# Patient Record
Sex: MALE | Race: WHITE | Employment: OTHER | URBAN - METROPOLITAN AREA
[De-identification: names, ages, dates, MRNs, and addresses within clinical notes are randomized per-mention and may not be internally consistent; named-entity substitution may affect disease eponyms.]

---

## 2018-07-13 PROBLEM — R33.9 URINARY RETENTION: Status: ACTIVE | Noted: 2018-07-13

## 2018-09-18 RX ORDER — THERA TABS 400 MCG
1 TAB ORAL DAILY
COMMUNITY

## 2018-09-18 NOTE — PERIOP NOTES
PAT - SURGICAL PRE-ADMISSION INSTRUCTIONS    NAME:  Nay Sepulveda                                                          TODAY'S DATE:  9/18/2018    SURGERY DATE:  9/20/2018                                  SURGERY ARRIVAL TIME:   0930    1. Do NOT eat or drink anything, including candy or gum, after MIDNIGHT on 09/19/18 , unless you have specific instructions from your Surgeon or Anesthesia Provider to do so. 2. No smoking on the day of surgery. 3. No alcohol 24 hours prior to the day of surgery. 4. No recreational drugs for one week prior to the day of surgery. 5. Leave all valuables, including money/purse, at home. 6. Remove all jewelry, nail polish, makeup (including mascara); no lotions, powders, deodorant, or perfume/cologne/after shave. 7. Glasses/Contact lenses and Dentures may be worn to the hospital.  They will be removed prior to surgery. 8. Call your doctor if symptoms of a cold or illness develop within 24 ours prior to surgery. 9. AN ADULT MUST DRIVE YOU HOME AFTER OUTPATIENT SURGERY. 10. If you are having an OUTPATIENT procedure, please make arrangements for a responsible adult to be with you for 24 hours after your surgery. 11. If you are admitted to the hospital, you will be assigned to a bed after surgery is complete. Normally a family member will not be able to see you until you are in your assigned bed. 15. Family is encouraged to accompany you to the hospital.  We ask visitors in the treatment area to be limited to ONE person at a time to ensure patient privacy. EXCEPTIONS WILL BE MADE AS NEEDED. 15. Children under 12 are discouraged from entering the treatment area and need to be supervised by an adult when in the waiting room. Special Instructions:    NONE. Patient Prep:    shower with anti-bacterial soap    These surgical instructions were reviewed with Nitesh Pavon during the PAT phone call. Directions:   On the morning of surgery, please go to the [facility]. Enter the building from the [facility] entrance, 1st floor (next to the Emergency Room entrance). Take the elevator to the 2nd floor. Sign in at the Registration Desk.     If you have any questions and/or concerns, please do not hesitate to call:  (Prior to the day of surgery)  [facility] [place] unit:  [phone]  (Day of surgery)  [facility] unit:  [phone]

## 2018-09-19 ENCOUNTER — ANESTHESIA EVENT (OUTPATIENT)
Dept: SURGERY | Age: 68
End: 2018-09-19
Payer: MEDICARE

## 2018-09-20 ENCOUNTER — ANESTHESIA (OUTPATIENT)
Dept: SURGERY | Age: 68
End: 2018-09-20
Payer: MEDICARE

## 2018-09-20 ENCOUNTER — HOSPITAL ENCOUNTER (OUTPATIENT)
Age: 68
Setting detail: OBSERVATION
Discharge: HOME | End: 2018-09-21
Attending: UROLOGY | Admitting: UROLOGY
Payer: MEDICARE

## 2018-09-20 PROBLEM — N40.1 BPH WITH OBSTRUCTION/LOWER URINARY TRACT SYMPTOMS: Status: ACTIVE | Noted: 2018-09-20

## 2018-09-20 PROBLEM — N13.8 BPH WITH OBSTRUCTION/LOWER URINARY TRACT SYMPTOMS: Status: ACTIVE | Noted: 2018-09-20

## 2018-09-20 PROBLEM — N40.1 BPH WITH URINARY OBSTRUCTION: Status: ACTIVE | Noted: 2018-09-20

## 2018-09-20 PROBLEM — N13.8 BPH WITH URINARY OBSTRUCTION: Status: ACTIVE | Noted: 2018-09-20

## 2018-09-20 PROBLEM — R31.0 GROSS HEMATURIA: Status: ACTIVE | Noted: 2018-09-20

## 2018-09-20 PROCEDURE — 76210000026 HC REC RM PH II 1 TO 1.5 HR: Performed by: UROLOGY

## 2018-09-20 PROCEDURE — 76010000171 HC OR TIME 2 TO 2.5 HR INTENSV-TIER 1: Performed by: UROLOGY

## 2018-09-20 PROCEDURE — 74011250637 HC RX REV CODE- 250/637: Performed by: NURSE ANESTHETIST, CERTIFIED REGISTERED

## 2018-09-20 PROCEDURE — 74011250636 HC RX REV CODE- 250/636: Performed by: UROLOGY

## 2018-09-20 PROCEDURE — 74011250636 HC RX REV CODE- 250/636

## 2018-09-20 PROCEDURE — 77030018836 HC SOL IRR NACL ICUM -A

## 2018-09-20 PROCEDURE — 77030032490 HC SLV COMPR SCD KNE COVD -B: Performed by: UROLOGY

## 2018-09-20 PROCEDURE — 76210000017 HC OR PH I REC 1.5 TO 2 HR: Performed by: UROLOGY

## 2018-09-20 PROCEDURE — 77030010545: Performed by: UROLOGY

## 2018-09-20 PROCEDURE — 74011250636 HC RX REV CODE- 250/636: Performed by: NURSE ANESTHETIST, CERTIFIED REGISTERED

## 2018-09-20 PROCEDURE — 77030008683 HC TU ET CUF COVD -A: Performed by: ANESTHESIOLOGY

## 2018-09-20 PROCEDURE — 99218 HC RM OBSERVATION: CPT

## 2018-09-20 PROCEDURE — 77030013079 HC BLNKT BAIR HGGR 3M -A: Performed by: ANESTHESIOLOGY

## 2018-09-20 PROCEDURE — 77030027138 HC INCENT SPIROMETER -A

## 2018-09-20 PROCEDURE — 77030034696 HC CATH URETH FOL 2W BARD -A: Performed by: UROLOGY

## 2018-09-20 PROCEDURE — 77030018836 HC SOL IRR NACL ICUM -A: Performed by: UROLOGY

## 2018-09-20 PROCEDURE — 76060000035 HC ANESTHESIA 2 TO 2.5 HR: Performed by: UROLOGY

## 2018-09-20 PROCEDURE — 74011000258 HC RX REV CODE- 258: Performed by: UROLOGY

## 2018-09-20 PROCEDURE — 74011250637 HC RX REV CODE- 250/637: Performed by: UROLOGY

## 2018-09-20 PROCEDURE — 77030010545

## 2018-09-20 PROCEDURE — 74011000250 HC RX REV CODE- 250

## 2018-09-20 RX ORDER — SUCCINYLCHOLINE CHLORIDE 20 MG/ML
INJECTION INTRAMUSCULAR; INTRAVENOUS AS NEEDED
Status: DISCONTINUED | OUTPATIENT
Start: 2018-09-20 | End: 2018-09-20 | Stop reason: HOSPADM

## 2018-09-20 RX ORDER — SODIUM CHLORIDE 0.9 % (FLUSH) 0.9 %
5-10 SYRINGE (ML) INJECTION EVERY 8 HOURS
Status: DISCONTINUED | OUTPATIENT
Start: 2018-09-20 | End: 2018-09-21 | Stop reason: HOSPADM

## 2018-09-20 RX ORDER — DOCUSATE SODIUM 100 MG/1
100 CAPSULE, LIQUID FILLED ORAL 2 TIMES DAILY
Qty: 60 CAP | Refills: 0 | Status: SHIPPED | OUTPATIENT
Start: 2018-09-20 | End: 2018-10-20

## 2018-09-20 RX ORDER — OXYBUTYNIN CHLORIDE 5 MG/1
5 TABLET ORAL
Status: COMPLETED | OUTPATIENT
Start: 2018-09-20 | End: 2018-09-20

## 2018-09-20 RX ORDER — MIDAZOLAM HYDROCHLORIDE 1 MG/ML
INJECTION, SOLUTION INTRAMUSCULAR; INTRAVENOUS AS NEEDED
Status: DISCONTINUED | OUTPATIENT
Start: 2018-09-20 | End: 2018-09-20 | Stop reason: HOSPADM

## 2018-09-20 RX ORDER — MORPHINE SULFATE 2 MG/ML
2 INJECTION, SOLUTION INTRAMUSCULAR; INTRAVENOUS
Status: DISCONTINUED | OUTPATIENT
Start: 2018-09-20 | End: 2018-09-21 | Stop reason: HOSPADM

## 2018-09-20 RX ORDER — FAMOTIDINE 20 MG/1
20 TABLET, FILM COATED ORAL ONCE
Status: COMPLETED | OUTPATIENT
Start: 2018-09-20 | End: 2018-09-20

## 2018-09-20 RX ORDER — FUROSEMIDE 10 MG/ML
INJECTION INTRAMUSCULAR; INTRAVENOUS AS NEEDED
Status: DISCONTINUED | OUTPATIENT
Start: 2018-09-20 | End: 2018-09-20 | Stop reason: HOSPADM

## 2018-09-20 RX ORDER — SODIUM CHLORIDE 0.9 % (FLUSH) 0.9 %
5-10 SYRINGE (ML) INJECTION AS NEEDED
Status: DISCONTINUED | OUTPATIENT
Start: 2018-09-20 | End: 2018-09-21 | Stop reason: HOSPADM

## 2018-09-20 RX ORDER — FENTANYL CITRATE 50 UG/ML
50 INJECTION, SOLUTION INTRAMUSCULAR; INTRAVENOUS
Status: DISCONTINUED | OUTPATIENT
Start: 2018-09-20 | End: 2018-09-20

## 2018-09-20 RX ORDER — OXYCODONE AND ACETAMINOPHEN 5; 325 MG/1; MG/1
1 TABLET ORAL
Status: COMPLETED | OUTPATIENT
Start: 2018-09-20 | End: 2018-09-20

## 2018-09-20 RX ORDER — DUTASTERIDE 0.5 MG/1
0.5 CAPSULE, LIQUID FILLED ORAL
Status: DISCONTINUED | OUTPATIENT
Start: 2018-09-20 | End: 2018-09-20 | Stop reason: DRUGHIGH

## 2018-09-20 RX ORDER — SODIUM CHLORIDE 9 MG/ML
75 INJECTION, SOLUTION INTRAVENOUS CONTINUOUS
Status: DISCONTINUED | OUTPATIENT
Start: 2018-09-20 | End: 2018-09-21 | Stop reason: HOSPADM

## 2018-09-20 RX ORDER — OXYCODONE AND ACETAMINOPHEN 5; 325 MG/1; MG/1
1 TABLET ORAL
Status: DISCONTINUED | OUTPATIENT
Start: 2018-09-20 | End: 2018-09-21 | Stop reason: HOSPADM

## 2018-09-20 RX ORDER — FENTANYL CITRATE 50 UG/ML
INJECTION, SOLUTION INTRAMUSCULAR; INTRAVENOUS AS NEEDED
Status: DISCONTINUED | OUTPATIENT
Start: 2018-09-20 | End: 2018-09-20 | Stop reason: HOSPADM

## 2018-09-20 RX ORDER — OXYBUTYNIN CHLORIDE 5 MG/1
5 TABLET ORAL
Status: DISCONTINUED | OUTPATIENT
Start: 2018-09-20 | End: 2018-09-21 | Stop reason: HOSPADM

## 2018-09-20 RX ORDER — DUTASTERIDE 0.5 MG/1
0.5 CAPSULE, LIQUID FILLED ORAL
Status: DISCONTINUED | OUTPATIENT
Start: 2018-09-20 | End: 2018-09-21 | Stop reason: HOSPADM

## 2018-09-20 RX ORDER — SODIUM CHLORIDE 0.9 % (FLUSH) 0.9 %
5-10 SYRINGE (ML) INJECTION EVERY 8 HOURS
Status: DISCONTINUED | OUTPATIENT
Start: 2018-09-20 | End: 2018-09-20 | Stop reason: HOSPADM

## 2018-09-20 RX ORDER — DOCUSATE SODIUM 100 MG/1
100 CAPSULE, LIQUID FILLED ORAL 2 TIMES DAILY
Status: DISCONTINUED | OUTPATIENT
Start: 2018-09-20 | End: 2018-09-21 | Stop reason: HOSPADM

## 2018-09-20 RX ORDER — LIDOCAINE HYDROCHLORIDE 10 MG/ML
0.1 INJECTION, SOLUTION EPIDURAL; INFILTRATION; INTRACAUDAL; PERINEURAL AS NEEDED
Status: DISCONTINUED | OUTPATIENT
Start: 2018-09-20 | End: 2018-09-20 | Stop reason: HOSPADM

## 2018-09-20 RX ORDER — PROPOFOL 10 MG/ML
INJECTION, EMULSION INTRAVENOUS AS NEEDED
Status: DISCONTINUED | OUTPATIENT
Start: 2018-09-20 | End: 2018-09-20 | Stop reason: HOSPADM

## 2018-09-20 RX ORDER — MAGNESIUM SULFATE 100 %
4 CRYSTALS MISCELLANEOUS AS NEEDED
Status: DISCONTINUED | OUTPATIENT
Start: 2018-09-20 | End: 2018-09-20

## 2018-09-20 RX ORDER — FENTANYL CITRATE 50 UG/ML
25 INJECTION, SOLUTION INTRAMUSCULAR; INTRAVENOUS AS NEEDED
Status: DISCONTINUED | OUTPATIENT
Start: 2018-09-20 | End: 2018-09-20

## 2018-09-20 RX ORDER — NALOXONE HYDROCHLORIDE 0.4 MG/ML
0.4 INJECTION, SOLUTION INTRAMUSCULAR; INTRAVENOUS; SUBCUTANEOUS AS NEEDED
Status: DISCONTINUED | OUTPATIENT
Start: 2018-09-20 | End: 2018-09-21 | Stop reason: HOSPADM

## 2018-09-20 RX ORDER — ACETAMINOPHEN 325 MG/1
650 TABLET ORAL
Status: DISCONTINUED | OUTPATIENT
Start: 2018-09-20 | End: 2018-09-21 | Stop reason: HOSPADM

## 2018-09-20 RX ORDER — SODIUM CHLORIDE 0.9 % (FLUSH) 0.9 %
5-10 SYRINGE (ML) INJECTION AS NEEDED
Status: DISCONTINUED | OUTPATIENT
Start: 2018-09-20 | End: 2018-09-20 | Stop reason: HOSPADM

## 2018-09-20 RX ORDER — OXYBUTYNIN CHLORIDE 5 MG/1
5 TABLET ORAL 3 TIMES DAILY
Qty: 15 TAB | Refills: 3 | Status: SHIPPED | OUTPATIENT
Start: 2018-09-20 | End: 2019-04-05

## 2018-09-20 RX ORDER — TAMSULOSIN HYDROCHLORIDE 0.4 MG/1
0.8 CAPSULE ORAL
Status: DISCONTINUED | OUTPATIENT
Start: 2018-09-20 | End: 2018-09-21 | Stop reason: HOSPADM

## 2018-09-20 RX ORDER — SODIUM CHLORIDE, SODIUM LACTATE, POTASSIUM CHLORIDE, CALCIUM CHLORIDE 600; 310; 30; 20 MG/100ML; MG/100ML; MG/100ML; MG/100ML
50 INJECTION, SOLUTION INTRAVENOUS CONTINUOUS
Status: DISCONTINUED | OUTPATIENT
Start: 2018-09-20 | End: 2018-09-20 | Stop reason: HOSPADM

## 2018-09-20 RX ORDER — CEPHALEXIN 500 MG/1
500 CAPSULE ORAL 2 TIMES DAILY
Qty: 14 CAP | Refills: 0 | Status: SHIPPED | OUTPATIENT
Start: 2018-09-20 | End: 2018-09-27

## 2018-09-20 RX ORDER — LIDOCAINE HYDROCHLORIDE 20 MG/ML
INJECTION, SOLUTION EPIDURAL; INFILTRATION; INTRACAUDAL; PERINEURAL AS NEEDED
Status: DISCONTINUED | OUTPATIENT
Start: 2018-09-20 | End: 2018-09-20 | Stop reason: HOSPADM

## 2018-09-20 RX ORDER — SODIUM CHLORIDE, SODIUM LACTATE, POTASSIUM CHLORIDE, CALCIUM CHLORIDE 600; 310; 30; 20 MG/100ML; MG/100ML; MG/100ML; MG/100ML
25 INJECTION, SOLUTION INTRAVENOUS CONTINUOUS
Status: DISCONTINUED | OUTPATIENT
Start: 2018-09-20 | End: 2018-09-20

## 2018-09-20 RX ORDER — EPHEDRINE SULFATE 50 MG/ML
INJECTION, SOLUTION INTRAVENOUS AS NEEDED
Status: DISCONTINUED | OUTPATIENT
Start: 2018-09-20 | End: 2018-09-20 | Stop reason: HOSPADM

## 2018-09-20 RX ORDER — OXYCODONE AND ACETAMINOPHEN 5; 325 MG/1; MG/1
1 TABLET ORAL
Qty: 15 TAB | Refills: 0 | Status: SHIPPED | OUTPATIENT
Start: 2018-09-20 | End: 2019-04-05

## 2018-09-20 RX ORDER — DEXTROSE MONOHYDRATE 25 G/50ML
25-50 INJECTION, SOLUTION INTRAVENOUS AS NEEDED
Status: DISCONTINUED | OUTPATIENT
Start: 2018-09-20 | End: 2018-09-20

## 2018-09-20 RX ADMIN — SODIUM CHLORIDE 75 ML/HR: 900 INJECTION, SOLUTION INTRAVENOUS at 19:46

## 2018-09-20 RX ADMIN — DUTASTERIDE 0.5 MG: 0.5 CAPSULE, LIQUID FILLED ORAL at 22:00

## 2018-09-20 RX ADMIN — FAMOTIDINE 20 MG: 20 TABLET ORAL at 10:14

## 2018-09-20 RX ADMIN — OXYCODONE HYDROCHLORIDE AND ACETAMINOPHEN 1 TABLET: 5; 325 TABLET ORAL at 16:38

## 2018-09-20 RX ADMIN — SUCCINYLCHOLINE CHLORIDE 100 MG: 20 INJECTION INTRAMUSCULAR; INTRAVENOUS at 14:05

## 2018-09-20 RX ADMIN — FENTANYL CITRATE 50 MCG: 50 INJECTION, SOLUTION INTRAMUSCULAR; INTRAVENOUS at 15:22

## 2018-09-20 RX ADMIN — OXYBUTYNIN CHLORIDE 5 MG: 5 TABLET ORAL at 19:54

## 2018-09-20 RX ADMIN — EPHEDRINE SULFATE 10 MG: 50 INJECTION, SOLUTION INTRAVENOUS at 14:35

## 2018-09-20 RX ADMIN — SODIUM CHLORIDE, SODIUM LACTATE, POTASSIUM CHLORIDE, AND CALCIUM CHLORIDE: 600; 310; 30; 20 INJECTION, SOLUTION INTRAVENOUS at 13:59

## 2018-09-20 RX ADMIN — DOCUSATE SODIUM 100 MG: 100 CAPSULE, LIQUID FILLED ORAL at 21:22

## 2018-09-20 RX ADMIN — Medication 10 ML: at 22:00

## 2018-09-20 RX ADMIN — SODIUM CHLORIDE, SODIUM LACTATE, POTASSIUM CHLORIDE, AND CALCIUM CHLORIDE 50 ML/HR: 600; 310; 30; 20 INJECTION, SOLUTION INTRAVENOUS at 09:39

## 2018-09-20 RX ADMIN — LIDOCAINE HYDROCHLORIDE 40 MG: 20 INJECTION, SOLUTION EPIDURAL; INFILTRATION; INTRACAUDAL; PERINEURAL at 14:05

## 2018-09-20 RX ADMIN — MIDAZOLAM HYDROCHLORIDE 2 MG: 1 INJECTION, SOLUTION INTRAMUSCULAR; INTRAVENOUS at 13:55

## 2018-09-20 RX ADMIN — FUROSEMIDE 10 MG: 10 INJECTION INTRAMUSCULAR; INTRAVENOUS at 15:39

## 2018-09-20 RX ADMIN — SODIUM CHLORIDE, SODIUM LACTATE, POTASSIUM CHLORIDE, AND CALCIUM CHLORIDE: 600; 310; 30; 20 INJECTION, SOLUTION INTRAVENOUS at 15:31

## 2018-09-20 RX ADMIN — TAMSULOSIN HYDROCHLORIDE 0.8 MG: 0.4 CAPSULE ORAL at 19:54

## 2018-09-20 RX ADMIN — OXYBUTYNIN CHLORIDE 5 MG: 5 TABLET ORAL at 17:00

## 2018-09-20 RX ADMIN — FENTANYL CITRATE 100 MCG: 50 INJECTION, SOLUTION INTRAMUSCULAR; INTRAVENOUS at 14:05

## 2018-09-20 RX ADMIN — FENTANYL CITRATE 50 MCG: 50 INJECTION, SOLUTION INTRAMUSCULAR; INTRAVENOUS at 15:28

## 2018-09-20 RX ADMIN — PROPOFOL 150 MG: 10 INJECTION, EMULSION INTRAVENOUS at 14:05

## 2018-09-20 RX ADMIN — FENTANYL CITRATE 50 MCG: 50 INJECTION, SOLUTION INTRAMUSCULAR; INTRAVENOUS at 15:05

## 2018-09-20 RX ADMIN — CEFTRIAXONE SODIUM 1 G: 1 INJECTION, POWDER, FOR SOLUTION INTRAMUSCULAR; INTRAVENOUS at 14:10

## 2018-09-20 NOTE — PERIOP NOTES
[facility] Changed Overnight ansari bag to Leg Bag. 500cc drained. 1700 Dr. Rj Zavala by bedside. Due to distance Dr. Rj Zavala will admit patient. Pt given prescriptions for Colace, Oxybutin Percocet, and Keflex as per . 1705 D/c IVF and capped.

## 2018-09-20 NOTE — ANESTHESIA PREPROCEDURE EVALUATION
Anesthetic History No history of anesthetic complications Review of Systems / Medical History Patient summary reviewed and pertinent labs reviewed Pulmonary Within defined limits Sleep apnea Comments: Pt denies Neuro/Psych Within defined limits Cardiovascular Within defined limits Exercise tolerance: >4 METS 
  
GI/Hepatic/Renal 
Within defined limits Endo/Other Within defined limits Other Findings Physical Exam 
 
Airway Mallampati: II 
TM Distance: 4 - 6 cm Neck ROM: normal range of motion Mouth opening: Normal 
 
 Cardiovascular Regular rate and rhythm,  S1 and S2 normal,  no murmur, click, rub, or gallop Rhythm: regular Rate: normal 
 
 
 
 Dental 
 
Dentition: Lower dentition intact and Upper dentition intact Pulmonary Breath sounds clear to auscultation Abdominal 
GI exam deferred Other Findings Anesthetic Plan ASA: 3 Anesthesia type: general 
 
 
 
 
Induction: Intravenous Anesthetic plan and risks discussed with: Patient

## 2018-09-20 NOTE — IP AVS SNAPSHOT
[address] Patient: Giselle Crawley MRN: [id number] CHC:[birth year]20 About your hospitalization You were admitted on:  September 20, 2018 You last received care in the:  [address] [place] [address],[address] You were discharged on:  September 21, 2018 Why you were hospitalized Your primary diagnosis was:  Not on File Your diagnoses also included:  Bph With Obstruction/Lower Urinary Tract Symptoms, Urinary Retention, Bph With Urinary Obstruction Follow-up Information Follow up With Details Comments Contact Info Douglas Perkins MD   [address] [facility] [address] 
[phone] Karina Casarez MD  3-5 days for ansari removal  [address] [place] [id number] [phone] Discharge Orders None A check radha indicates which time of day the medication should be taken. My Medications START taking these medications Instructions Each Dose to Equal  
 Morning Noon Evening Bedtime  
 cephALEXin 500 mg capsule Commonly known as:  Alanis Screws Your last dose was: Your next dose is: Take 1 Cap by mouth two (2) times a day for 7 days. 500 mg  
    
   
   
   
  
 docusate sodium 100 mg capsule Commonly known as:  Deysi Rosalie Your last dose was: Your next dose is: Take 1 Cap by mouth two (2) times a day for 30 days. 100 mg  
    
   
   
   
  
 oxybutynin 5 mg tablet Commonly known as:  QNJICCSD Your last dose was: Your next dose is: Take 1 Tab by mouth three (3) times daily. Indications: URINARY URGENCY  
 5 mg * oxyCODONE-acetaminophen 5-325 mg per tablet Commonly known as:  PERCOCET Your last dose was: Your next dose is: Take 1 Tab by mouth every four (4) hours as needed for Pain. Max Daily Amount: 6 Tabs. 1 Tab * oxyCODONE-acetaminophen 5-325 mg per tablet Commonly known as:  PERCOCET Your last dose was: Your next dose is: Take 1 Tab by mouth every four (4) hours as needed. Max Daily Amount: 6 Tabs. 1 Tab  
    
   
   
   
  
 trimethoprim-sulfamethoxazole 160-800 mg per tablet Commonly known as:  BACTRIM DS, SEPTRA DS Your last dose was: Your next dose is: Take 1 Tab by mouth two (2) times a day. 1 Tab * Notice: This list has 2 medication(s) that are the same as other medications prescribed for you. Read the directions carefully, and ask your doctor or other care provider to review them with you. CONTINUE taking these medications Instructions Each Dose to Equal  
 Morning Noon Evening Bedtime  
 dutasteride 0.5 mg capsule Commonly known as:  AVODART Your last dose was: Your next dose is: Take 1 Cap by mouth daily (after dinner) for 90 days. 0.5 mg  
    
   
   
   
  
 tamsulosin 0.4 mg capsule Commonly known as:  FLOMAX Your last dose was: Your next dose is: Take 2 Caps by mouth daily (after dinner). 0.8 mg  
    
   
   
   
  
 therapeutic multivitamin tablet Commonly known as:  [facility] Your last dose was: Your next dose is: Take 1 Tab by mouth daily. 1 Tab STOP taking these medications   
 fish oil-omega-3 fatty acids 340-1,000 mg capsule Where to Get Your Medications Information on where to get these meds will be given to you by the nurse or doctor. ! Ask your nurse or doctor about these medications  
  cephALEXin 500 mg capsule  
 docusate sodium 100 mg capsule  
 oxybutynin 5 mg tablet  
 oxyCODONE-acetaminophen 5-325 mg per tablet  
 oxyCODONE-acetaminophen 5-325 mg per tablet  
 trimethoprim-sulfamethoxazole 160-800 mg per tablet Opioid Education Prescription Opioids: What You Need to Know: 
 
 
 
F-face looks uneven A-arms unable to move or move unevenly S-speech slurred or non-existent T-time-call 911 as soon as signs and symptoms begin-DO NOT go Back to bed or wait to see if you get better-TIME IS BRAIN. Warning Signs of HEART ATTACK Call 911 if you have these symptoms: 
? Chest discomfort. Most heart attacks involve discomfort in the center of the chest that lasts more than a few minutes, or that goes away and comes back. It can feel like uncomfortable pressure, squeezing, fullness, or pain. ? Discomfort in other areas of the upper body. Symptoms can include pain or discomfort in one or both arms, the back, neck, jaw, or stomach. ? Shortness of breath with or without chest discomfort. ? Other signs may include breaking out in a cold sweat, nausea, or lightheadedness. Don't wait more than five minutes to call [address]! Fast action can save your life. Calling 911 is almost always the fastest way to get lifesaving treatment. Emergency Medical Services staff can begin treatment when they arrive  up to an hour sooner than if someone gets to the hospital by car. The discharge information has been reviewed with the patient. The patient verbalized understanding. Discharge medications reviewed with the patient and appropriate educational materials and side effects teaching were provided.  
___________________________________________________________________________ ________________________________________________________ Laser Transurethral Resection of the Prostate (TURP): What to Expect at [facility] Your Recovery Transurethral resection of the prostate (TURP) is surgery to remove prostate tissue. This is done when an overgrown prostate gland is pressing on the urethra and making it hard for a man to urinate. After laser surgery, you will have a urinary catheter for a short time. It is a flexible plastic tube used to drain urine from your bladder when you can't urinate on your own. If it is still in place when you go home, your doctor will give you instructions on how to care for it. For several days after surgery, you may feel burning when you urinate. Your urine may be pink for 1 to 3 weeks after surgery. You also may have bladder cramps, or spasms. Your doctor may give you medicine to help control the spasms. You may still feel like you need to urinate often in the weeks after your surgery. It often takes up to 6 weeks for this to get better. After you have healed, you may have less trouble urinating. You may have better control over starting and stopping your urine stream. And you may feel like you get more relief when you urinate. After laser TURP, most men can return to work or many of their usual tasks in a few days. But for about 2 weeks, try to avoid heavy lifting and strenuous activities that might put extra pressure on your bladder. Most men still can have erections after surgery (if they were able to have them before surgery). But they may not ejaculate when they have an orgasm. Semen may go into the bladder instead of out through the penis. This is called retrograde ejaculation. It does not hurt and is not harmful to your health. This care sheet gives you a general idea about how long it will take for you to recover. But each person recovers at a different pace. Follow the steps below to get better as quickly as possible. How can you care for yourself at home? Activity 
  · Rest when you feel tired.  
  · Be active. Walking is a good choice.  
  · Allow your body to heal. Don't move quickly or lift anything heavy until you are feeling better.  
  · Ask your doctor when you can drive again.  
  · Many people are able to return to work within a few days after surgery.  
  · Do not put anything in your rectum, such as an enema or suppository, for 4 to 6 weeks after the surgery.  
  · You may shower and take baths when your doctor says it is okay.  
  · Ask your doctor when it is okay for you to have sex. Diet 
  · You can eat your normal diet. If your stomach is upset, try bland, low-fat foods like plain rice, broiled chicken, toast, and yogurt.  
  · If your bowel movements are not regular right after surgery, try to avoid constipation and straining. Drink plenty of water. Your doctor may suggest fiber, a stool softener, or a mild laxative. Medicines 
  · Your doctor will tell you if and when you can restart your medicines. He or she will also give you instructions about taking any new medicines.  
  · If you take aspirin or some other blood thinner, be sure to talk to your doctor. He or she will tell you if and when to start taking those medicines again. Make sure that you understand exactly what your doctor wants you to do.  
  · Be safe with medicines. Read and follow all instructions on the label. ¨ If the doctor gave you a prescription medicine for pain, take it as prescribed. ¨ If you are not taking a prescription pain medicine, ask your doctor if you can take an over-the-counter medicine.  
  · Take your antibiotics as directed. Do not stop taking them just because you feel better. You need to take the full course of antibiotics. Follow-up care is a key part of your treatment and safety.  Be sure to make and go to all appointments, and call your doctor if you are having problems. It's also a good idea to know your test results and keep a list of the medicines you take. When should you call for help? Call 911 anytime you think you may need emergency care. For example, call if: 
  · You passed out (lost consciousness).  
  · You have chest pain, are short of breath, or cough up blood.  
 Call your doctor now or seek immediate medical care if: 
  · Your have loose stitches or your incision comes open.  
  · Bright red blood soaks through the bandage.  
  · You have signs of infection, such as: 
¨ Increased pain, swelling, warmth, or redness. ¨ Red streaks leading from the area. ¨ Pus draining from the area. ¨ A fever.  
  · You cannot urinate.  
  · You have symptoms of a urinary tract infection. These may include: 
¨ Pain or burning when you urinate. ¨ A frequent need to urinate without being able to pass much urine. ¨ Pain in the flank, which is just below the rib cage and above the waist on either side of the back. ¨ Blood in your urine. ¨ A fever.  
  · You are sick to your stomach or cannot drink fluids.  
  · You have signs of a blood clot in your leg (called a deep vein thrombosis), such as: 
¨ Pain in your calf, back of the knee, thigh, or groin. ¨ Redness or swelling in your leg.  
 Watch closely for changes in your health, and be sure to contact your doctor if you have any problems. Where can you learn more? Go to http://glen-charley.info/. Enter H273 in the search box to learn more about \"Laser Transurethral Resection of the Prostate (TURP): What to Expect at Home. \" Current as of: December 3, 2017 Content Version: 11.7 © 8076-9438 [employer]. Care instructions adapted under license by [employer] (which disclaims liability or warranty for this information).  If you have questions about a medical condition or this instruction, always ask your healthcare professional. [employer] disclaims any warranty or liability for your use of this information. DISCHARGE SUMMARY from Nurse PATIENT INSTRUCTIONS: 
 
After general anesthesia or intravenous sedation, for 24 hours or while taking prescription Narcotics: · Limit your activities · Do not drive and operate hazardous machinery · Do not make important personal or business decisions · Do  not drink alcoholic beverages · If you have not urinated within 8 hours after discharge, please contact your surgeon on call. Report the following to your surgeon: 
· Excessive pain, swelling, redness or odor of or around the surgical area · Temperature over 100.5 · Nausea and vomiting lasting longer than 4 hours or if unable to take medications · Any signs of decreased circulation or nerve impairment to extremity: change in color, persistent  numbness, tingling, coldness or increase pain · Any questions What to do at Home: 
Recommended activity: Activity as tolerated and no driving for today These are general instructions for a healthy lifestyle: No smoking/ No tobacco products/ Avoid exposure to second hand smoke Surgeon General's Warning:  Quitting smoking now greatly reduces serious risk to your health. Obesity, smoking, and sedentary lifestyle greatly increases your risk for illness A healthy diet, regular physical exercise & weight monitoring are important for maintaining a healthy lifestyle You may be retaining fluid if you have a history of heart failure or if you experience any of the following symptoms:  Weight gain of 3 pounds or more overnight or 5 pounds in a week, increased swelling in our hands or feet or shortness of breath while lying flat in bed. Please call your doctor as soon as you notice any of these symptoms; do not wait until your next office visit. Recognize signs and symptoms of STROKE: 
 
F-face looks uneven A-arms unable to move or move unevenly S-speech slurred or non-existent T-time-call 911 as soon as signs and symptoms begin-DO NOT go Back to bed or wait to see if you get better-TIME IS BRAIN. Warning Signs of HEART ATTACK Call 911 if you have these symptoms: 
? Chest discomfort. Most heart attacks involve discomfort in the center of the chest that lasts more than a few minutes, or that goes away and comes back. It can feel like uncomfortable pressure, squeezing, fullness, or pain. ? Discomfort in other areas of the upper body. Symptoms can include pain or discomfort in one or both arms, the back, neck, jaw, or stomach. ? Shortness of breath with or without chest discomfort. ? Other signs may include breaking out in a cold sweat, nausea, or lightheadedness. Don't wait more than five minutes to call [address]! Fast action can save your life. Calling 911 is almost always the fastest way to get lifesaving treatment. Emergency Medical Services staff can begin treatment when they arrive  up to an hour sooner than if someone gets to the hospital by car. The discharge information has been reviewed with the patient. The patient verbalized understanding. Discharge medications reviewed with the patient and appropriate educational materials and side effects teaching were provided. Patient armband removed and given to patient to take home. Patient was informed of the privacy risks if armband lost or stolen. [employer] Announcement We are excited to announce that we are making your provider's discharge notes available to you in [employer]. You will see these notes when they are completed and signed by the physician that discharged you from your recent hospital stay. If you have any questions or concerns about any information you see in [employer], please call the Health Information Department where you were seen or reach out to your Primary Care Provider for more information about your plan of care. Introducing [facility] & HEALTH SERVICES! Dear Juana Botello: Thank you for requesting a [employer] account. Our records indicate that you already have an active [employer] account. You can access your account anytime at [email or web address] Did you know that you can access your hospital and ER discharge instructions at any time in [employer]? You can also review all of your test results from your hospital stay or ER visit. Additional Information If you have questions, please visit the Frequently Asked Questions section of the [employer] website at [email or web address] Remember, [employer] is NOT to be used for urgent needs. For medical emergencies, dial 911. Now available from your iPhone and Android! Introducing Jordan Blount As a [facility] patient, I wanted to make you aware of our electronic visit tool called Jordan Blount. [employer] [facility] Heartscape allows you to connect within minutes with a medical provider 24 hours a day, seven days a week via a mobile device or tablet or logging into a secure website from your computer. You can access Jordan Blount from anywhere in the [place]. A virtual visit might be right for you when you have a simple condition and feel like you just dont want to get out of bed, or cant get away from work for an appointment, when your regular [facility] provider is not available (evenings, weekends or holidays), or when youre out of town and need minor care. Electronic visits cost only $49 and if the [employer] [facility] Karmarama/Taggled provider determines a prescription is needed to treat your condition, one can be electronically transmitted to a nearby pharmacy*. Please take a moment to enroll today if you have not already done so. The enrollment process is free and takes just a few minutes. To enroll, please download the [employer] renan to your tablet or phone, or visit [email or web address] to enroll on your computer. And, as an [address] [place] [address] patient with a [employer]mind account, the results of your visits will be scanned into your electronic medical record and your primary care provider will be able to view the scanned results. We urge you to continue to see your regular Romayne Duster provider for your ongoing medical care. And while your primary care provider may not be the one available when you seek a Jordan Tayleathafin virtual visit, the peace of mind you get from getting a real diagnosis real time can be priceless. For more information on Jordan Blount, view our Frequently Asked Questions (FAQs) at [email or web address]. Sincerely, 
 
Meeta Hernandez MD 
Chief Medical Officer [employer] *:  certain medications cannot be prescribed via Jordan Taydivina Providers Seen During Your Hospitalization Provider Specialty Primary office phone Kyala Ford MD Urology [phone] Your Primary Care Physician (PCP) Primary Care Physician Office Phone Office Fax Tania Ingram [phone] You are allergic to the following No active allergies Recent Documentation Height Weight BMI Smoking Status 1.626 m 63 kg 23.86 kg/m2 Never Smoker Emergency Contacts Name Discharge Info Relation Home Work Mobile Yonny Valiente DISCHARGE CAREGIVER [3] Friend [5] [phone] SvenmilaArthur DISCHARGE CAREGIVER [3] Friend [5]   [phone] Patient Belongings The following personal items are in your possession at time of discharge: 
  Dental Appliances: None  Visual Aid: None      Home Medications: None   Jewelry: None  Clothing: Shorts, Shirt, Footwear, Undergarments    Other Valuables: [employer] Please provide this summary of care documentation to your next provider.  
  
  
 
  
Signatures-by signing, you are acknowledging that this After Visit Summary has been reviewed with you and you have received a copy. Patient Signature:  ____________________________________________________________ Date:  ____________________________________________________________  
  
Lane Shove Provider Signature:  ____________________________________________________________ Date:  ____________________________________________________________

## 2018-09-20 NOTE — PROGRESS NOTES
brief progress note: The patient has some mild hematuria post large PVP. The patient [place]. I) instructed as he is out of town we could bring him in for observation overnight and he is more comfortable with this. Will recheck in AM.  As needed bladder irrigations tonight.     Alexandra Waters MD

## 2018-09-20 NOTE — DISCHARGE INSTRUCTIONS
DISCHARGE SUMMARY from Nurse    PATIENT INSTRUCTIONS:    After general anesthesia or intravenous sedation, for 24 hours or while taking prescription Narcotics:  · Limit your activities  · Do not drive and operate hazardous machinery  · Do not make important personal or business decisions  · Do  not drink alcoholic beverages  · If you have not urinated within 8 hours after discharge, please contact your surgeon on call. Report the following to your surgeon:  · Excessive pain, swelling, redness or odor of or around the surgical area  · Temperature over 100.5  · Nausea and vomiting lasting longer than 4 hours or if unable to take medications  · Any signs of decreased circulation or nerve impairment to extremity: change in color, persistent  numbness, tingling, coldness or increase pain  · Any questions    What to do at Home:  Recommended activity: Activity as tolerated,     *  Please give a list of your current medications to your Primary Care Provider. *  Please update this list whenever your medications are discontinued, doses are      changed, or new medications (including over-the-counter products) are added. *  Please carry medication information at all times in case of emergency situations. These are general instructions for a healthy lifestyle:    No smoking/ No tobacco products/ Avoid exposure to second hand smoke  Surgeon General's Warning:  Quitting smoking now greatly reduces serious risk to your health. Obesity, smoking, and sedentary lifestyle greatly increases your risk for illness    A healthy diet, regular physical exercise & weight monitoring are important for maintaining a healthy lifestyle    You may be retaining fluid if you have a history of heart failure or if you experience any of the following symptoms:  Weight gain of 3 pounds or more overnight or 5 pounds in a week, increased swelling in our hands or feet or shortness of breath while lying flat in bed.   Please call your doctor as soon as you notice any of these symptoms; do not wait until your next office visit. Recognize signs and symptoms of STROKE:    F-face looks uneven    A-arms unable to move or move unevenly    S-speech slurred or non-existent    T-time-call 911 as soon as signs and symptoms begin-DO NOT go       Back to bed or wait to see if you get better-TIME IS BRAIN. Warning Signs of HEART ATTACK     Call 911 if you have these symptoms:   Chest discomfort. Most heart attacks involve discomfort in the center of the chest that lasts more than a few minutes, or that goes away and comes back. It can feel like uncomfortable pressure, squeezing, fullness, or pain.  Discomfort in other areas of the upper body. Symptoms can include pain or discomfort in one or both arms, the back, neck, jaw, or stomach.  Shortness of breath with or without chest discomfort.  Other signs may include breaking out in a cold sweat, nausea, or lightheadedness. Don't wait more than five minutes to call 911 - MINUTES MATTER! Fast action can save your life. Calling 911 is almost always the fastest way to get lifesaving treatment. Emergency Medical Services staff can begin treatment when they arrive -- up to an hour sooner than if someone gets to the hospital by car. The discharge information has been reviewed with the patient. The patient verbalized understanding. Discharge medications reviewed with the patient and appropriate educational materials and side effects teaching were provided. ___________________________________________________________________________________________________________________________________       Laser Transurethral Resection of the Prostate (TURP): What to Expect at [facility]    Transurethral resection of the prostate (TURP) is surgery to remove prostate tissue. This is done when an overgrown prostate gland is pressing on the urethra and making it hard for a man to urinate.   After laser surgery, you will have a urinary catheter for a short time. It is a flexible plastic tube used to drain urine from your bladder when you can't urinate on your own. If it is still in place when you go home, your doctor will give you instructions on how to care for it. For several days after surgery, you may feel burning when you urinate. Your urine may be pink for 1 to 3 weeks after surgery. You also may have bladder cramps, or spasms. Your doctor may give you medicine to help control the spasms. You may still feel like you need to urinate often in the weeks after your surgery. It often takes up to 6 weeks for this to get better. After you have healed, you may have less trouble urinating. You may have better control over starting and stopping your urine stream. And you may feel like you get more relief when you urinate. After laser TURP, most men can return to work or many of their usual tasks in a few days. But for about 2 weeks, try to avoid heavy lifting and strenuous activities that might put extra pressure on your bladder. Most men still can have erections after surgery (if they were able to have them before surgery). But they may not ejaculate when they have an orgasm. Semen may go into the bladder instead of out through the penis. This is called retrograde ejaculation. It does not hurt and is not harmful to your health. This care sheet gives you a general idea about how long it will take for you to recover. But each person recovers at a different pace. Follow the steps below to get better as quickly as possible. How can you care for yourself at home? Activity    · Rest when you feel tired.     · Be active.  Walking is a good choice.     · Allow your body to heal. Don't move quickly or lift anything heavy until you are feeling better.     · Ask your doctor when you can drive again.     · Many people are able to return to work within a few days after surgery.     · Do not put anything in your rectum, such as an enema or suppository, for 4 to 6 weeks after the surgery.     · You may shower and take baths when your doctor says it is okay.     · Ask your doctor when it is okay for you to have sex. Diet    · You can eat your normal diet. If your stomach is upset, try bland, low-fat foods like plain rice, broiled chicken, toast, and yogurt.     · If your bowel movements are not regular right after surgery, try to avoid constipation and straining. Drink plenty of water. Your doctor may suggest fiber, a stool softener, or a mild laxative. Medicines    · Your doctor will tell you if and when you can restart your medicines. He or she will also give you instructions about taking any new medicines.     · If you take aspirin or some other blood thinner, be sure to talk to your doctor. He or she will tell you if and when to start taking those medicines again. Make sure that you understand exactly what your doctor wants you to do.     · Be safe with medicines. Read and follow all instructions on the label. ¨ If the doctor gave you a prescription medicine for pain, take it as prescribed. ¨ If you are not taking a prescription pain medicine, ask your doctor if you can take an over-the-counter medicine.     · Take your antibiotics as directed. Do not stop taking them just because you feel better. You need to take the full course of antibiotics. Follow-up care is a key part of your treatment and safety. Be sure to make and go to all appointments, and call your doctor if you are having problems. It's also a good idea to know your test results and keep a list of the medicines you take. When should you call for help? Call 911 anytime you think you may need emergency care.  For example, call if:    · You passed out (lost consciousness).     · You have chest pain, are short of breath, or cough up blood.    Call your doctor now or seek immediate medical care if:    · Your have loose stitches or your incision comes open.     · Bright red blood soaks through the bandage.     · You have signs of infection, such as:  ¨ Increased pain, swelling, warmth, or redness. ¨ Red streaks leading from the area. ¨ Pus draining from the area. ¨ A fever.     · You cannot urinate.     · You have symptoms of a urinary tract infection. These may include:  ¨ Pain or burning when you urinate. ¨ A frequent need to urinate without being able to pass much urine. ¨ Pain in the flank, which is just below the rib cage and above the waist on either side of the back. ¨ Blood in your urine. ¨ A fever.     · You are sick to your stomach or cannot drink fluids.     · You have signs of a blood clot in your leg (called a deep vein thrombosis), such as:  ¨ Pain in your calf, back of the knee, thigh, or groin. ¨ Redness or swelling in your leg.    Watch closely for changes in your health, and be sure to contact your doctor if you have any problems. Where can you learn more? Go to http://glen-charley.info/. Enter C899 in the search box to learn more about \"Laser Transurethral Resection of the Prostate (TURP): What to Expect at Home. \"  Current as of: December 3, 2017  Content Version: 11.7  © 2568-5751 [employer]. Care instructions adapted under license by [employer] (which disclaims liability or warranty for this information). If you have questions about a medical condition or this instruction, always ask your healthcare professional. [place] [address] any warranty or liability for your use of this information.     DISCHARGE SUMMARY from Nurse    PATIENT INSTRUCTIONS:    After general anesthesia or intravenous sedation, for 24 hours or while taking prescription Narcotics:  · Limit your activities  · Do not drive and operate hazardous machinery  · Do not make important personal or business decisions  · Do  not drink alcoholic beverages  · If you have not urinated within 8 hours after discharge, please contact your surgeon on call. Report the following to your surgeon:  · Excessive pain, swelling, redness or odor of or around the surgical area  · Temperature over 100.5  · Nausea and vomiting lasting longer than 4 hours or if unable to take medications  · Any signs of decreased circulation or nerve impairment to extremity: change in color, persistent  numbness, tingling, coldness or increase pain  · Any questions    What to do at Home:  Recommended activity: Activity as tolerated and no driving for today      These are general instructions for a healthy lifestyle:    No smoking/ No tobacco products/ Avoid exposure to second hand smoke  Surgeon General's Warning:  Quitting smoking now greatly reduces serious risk to your health. Obesity, smoking, and sedentary lifestyle greatly increases your risk for illness    A healthy diet, regular physical exercise & weight monitoring are important for maintaining a healthy lifestyle    You may be retaining fluid if you have a history of heart failure or if you experience any of the following symptoms:  Weight gain of 3 pounds or more overnight or 5 pounds in a week, increased swelling in our hands or feet or shortness of breath while lying flat in bed. Please call your doctor as soon as you notice any of these symptoms; do not wait until your next office visit. Recognize signs and symptoms of STROKE:    F-face looks uneven    A-arms unable to move or move unevenly    S-speech slurred or non-existent    T-time-call 911 as soon as signs and symptoms begin-DO NOT go       Back to bed or wait to see if you get better-TIME IS BRAIN. Warning Signs of HEART ATTACK     Call 911 if you have these symptoms:   Chest discomfort. Most heart attacks involve discomfort in the center of the chest that lasts more than a few minutes, or that goes away and comes back. It can feel like uncomfortable pressure, squeezing, fullness, or pain.  Discomfort in other areas of the upper body.  Symptoms can include pain or discomfort in one or both arms, the back, neck, jaw, or stomach.  Shortness of breath with or without chest discomfort.  Other signs may include breaking out in a cold sweat, nausea, or lightheadedness. Don't wait more than five minutes to call 911 - MINUTES MATTER! Fast action can save your life. Calling 911 is almost always the fastest way to get lifesaving treatment. Emergency Medical Services staff can begin treatment when they arrive -- up to an hour sooner than if someone gets to the hospital by car. The discharge information has been reviewed with the patient. The patient verbalized understanding. Discharge medications reviewed with the patient and appropriate educational materials and side effects teaching were provided. Patient armband removed and given to patient to take home. Patient was informed of the privacy risks if armband lost or stolen.

## 2018-09-20 NOTE — PERIOP NOTES
[facility] care of patient upon arrival to PACU. Patient drowsy, eyes open to verbal stimuli. Placed on monitor x3. VSS. Guillaume draining clear urine, c/d/i.     [id number]  Dr. Delacruz Master bedside in PACU to assess patient.

## 2018-09-20 NOTE — OP NOTES
PVP OPERATIVE NOTE    Date: 9/20/2018    Pre-Operative Diagnosis:  Benign Prostatic Hypertrophy (BPH)    Post-Operative Diagnosis:  Same    Procedure:  Cystoscopy and Photovaporization of Prostate with XPS using MoXy    Surgeon:  Major Dominguez,       Anesthesia:  LMA  EBL:  Negligible  Tubes and Drains:  22 Fr, 30 mL Guillaume  Specimen(s):  None  Complications:  None    Indications for Operation:  70 y.o.  male with progressive BPH with urinary retention, who is failing medical therapy and desires surgical intervention. He is followed by Rl Terrell MD. The procedure, risks, and complications were discussed at length. Workup included an AUA symptom score 35/3  Prostate volume is 117 grams by transrectal ultrasound. Post void residuals are 800 ml. His PSA is 10.47 with negative prostate biopsy. Flow rate of 0 ml/sec with a volume of 400 ml. Description of Procedure: The patient was identified and surgical site verification was performed prior to obtaining consent. The patient was brought to the cystoscopy suite. Under adequate LMA anesthesia, the patient was positioned in dorsal lithotomy and prepped and draped. A preoperative time out was performed addressing the anticipated surgical site, procedure, and safety precautions. Sequential compression stockings were applied. Prophylactic antibiotic (Cipro) was administered empirically. Laser prostatectomy was then initiated using a 23 [place] continuous flow laser resectoscope with the Greenlight XPS. The MoXy fiber was used. The scope was passed over the visual obturator. The anterior urethra was normal.  The prostatic urethra was obstructed due to severe trilobar hypertrophy. .  The bladder was inspected with the 30 lens. The ureteral orifices were identified. The ureteral orifices were in their normal anatomic positions. The bladder showed moderate trabeculations and occasional cellules. No bladder Tumors.       Attention was first directed toward the median lobe and a channel was created. Vaporization continued to the right and left lateral lobes followed by the anterior/ apical tissue. A good channel was obtained throughout. Limits of the vaporization were the bladder neck to the verumontanum, and laterally the prostatic capsule. Care was taken to avoid injury to the ureteral orifices, bladder neck, external sphincter, and verumontanum. Wattage varied from 60 to 180 carreno and coagulation was 40 carreno. Blood loss was negligible and hemostasis was excellent. A total of [id number] joules were used for a total laser time of 70 minutes and a total OR time of 90 minutes. The scope was removed, then a 22 Fr, 30 mL Guillaume catheter was placed. The patient was awakened and taken to the recovery room in stable condition. This case was greatly increased in difficutly because: 1) A markedly enlarged prostate 2) Prolonged surgery time 3) Fibrous gland 4) Increased vascularity causing increased bleeding. Plan:  Discharge home with catheter in place and anticipate a voiding trial Monday morning.     Ana Middleton MD

## 2018-09-20 NOTE — ANESTHESIA POSTPROCEDURE EVALUATION
Post-Anesthesia Evaluation and Assessment Patient: Salena Riley MRN: [id number]  SSN: [id number] YOB: 1950  Age: 70 y.o. Sex: male Cardiovascular Function/Vital Signs Visit Vitals  /64 (BP 1 Location: Right arm, BP Patient Position: At rest)  Pulse 68  Temp 36.7 °C (98 °F)  Resp 12  Ht 5' 4\" (1.626 m)  Wt 63 kg (139 lb)  SpO2 99%  BMI 23.86 kg/m2 Patient is status post general anesthesia for Procedure(s): 
photoselective vaporization of the prostate. Nausea/Vomiting: None Postoperative hydration reviewed and adequate. Pain: 
Pain Scale 1: Numeric (0 - 10) (09/20/18 1808) Pain Intensity 1: 0 (09/20/18 1808) Managed Neurological Status:  
Neuro (WDL): Within Defined Limits (09/20/18 1754) Neuro Neurologic State: Drowsy; Eyes open spontaneously (09/20/18 1614) Orientation Level: Oriented to person;Oriented to place;Oriented to situation (09/20/18 1754) Speech: Clear (09/20/18 1754) LUE Motor Response: Purposeful (09/20/18 1754) LLE Motor Response: Purposeful (09/20/18 1754) RUE Motor Response: Purposeful (09/20/18 1754) RLE Motor Response: Purposeful (09/20/18 1754) At baseline Mental Status and Level of Consciousness: Alert and oriented Pulmonary Status:  
O2 Device: Room air (09/20/18 1754) Adequate oxygenation and airway patent Complications related to anesthesia: None Post-anesthesia assessment completed. No concerns Signed By: Georgie Urena MD   
 September 20, 2018

## 2018-09-20 NOTE — PERIOP NOTES
Notified by Franco Wyman RN that pt will be admitted per Dr. Teetee Ray. Called Staffing Office, notified Patrizia Moses, [facility]  who states bed will be dropped. Called [facility] to speak to ADT RN, per Abigail Harding will call back. [address] [facility] [address] back to PACU until room available on [facility].    1926  Pt transported to PACU via [facility] with son. Leg bag and saline lock in place, no distress noted. 1931  TRANSFER - OUT REPORT:    Verbal report given to Aaron Sherman RN(name) on Nay Sepulveda  being transferred to 2200(unit) for routine post - op       Report consisted of patients Situation, Background, Assessment and   Recommendations(SBAR). Information from the following report(s) SBAR, Kardex, OR Summary, Intake/Output and MAR was reviewed with the receiving nurse. Lines:   Peripheral IV 09/20/18 Left Hand (Active)   Site Assessment Clean, dry, & intact 9/20/2018  6:09 PM   Phlebitis Assessment 0 9/20/2018  6:09 PM   Infiltration Assessment 0 9/20/2018  6:09 PM   Dressing Status Clean, dry, & intact 9/20/2018  6:09 PM   Dressing Type Tape;Transparent 9/20/2018  6:09 PM   Hub Color/Line Status Infusing;Pink 9/20/2018  6:09 PM   Action Taken Open ports on tubing capped 9/20/2018  4:14 PM   Alcohol Cap Used Yes 9/20/2018  5:54 PM        Opportunity for questions and clarification was provided.       Patient transported with:   Registered Nurse

## 2018-09-20 NOTE — PERIOP NOTES
Phase 2 Recovery Summary  Patient arrived to Phase 2 at 1807  Report received from Graciela Barrett:    09/20/18 1709 09/20/18 1724 09/20/18 1739 09/20/18 1808   BP: 122/66 125/74 123/70 106/64   Pulse: 69 60 (!) 57 68   Resp: 18 17 12 12   Temp:       SpO2: 93% 100% 98% 99%   Weight:       Height:           oriented to time, place, person and situation    Lines and Drains  Peripheral Intravenous Line:   Peripheral IV 09/20/18 Left Hand (Active)   Site Assessment Clean, dry, & intact 9/20/2018  6:09 PM   Phlebitis Assessment 0 9/20/2018  6:09 PM   Infiltration Assessment 0 9/20/2018  6:09 PM   Dressing Status Clean, dry, & intact 9/20/2018  6:09 PM   Dressing Type Tape;Transparent 9/20/2018  6:09 PM   Hub Color/Line Status Infusing;Pink 9/20/2018  6:09 PM   Action Taken Open ports on tubing capped 9/20/2018  4:14 PM   Alcohol Cap Used Yes 9/20/2018  4:14 PM       Wound             Patient discharged to home with friend, Rachel Conner  at [address] [place] [address]

## 2018-09-20 NOTE — H&P
ICD-10-CM ICD-9-CM     1. Urinary retention R33.9 788.20     2. BPH with obstruction/lower urinary tract symptoms N40.1 600.01       N13.8 599.69     3. Elevated PSA R97.20 790.93              ASSESSMENT:     1. BPH with LUTS/Urinary retention. Guillaume placed on 7/8/2018. Failed VT x3. Urodynamics 8/1/2018: small amount with a moderate pressure detrusor contraction, low flow(dribble) partial valsalva, with slight increase in EUS activity and high PVR. Cysto 8/28/2018: severe trilobar hypertrophy, catheter elated edema along back wall, prostate volume 107 grams. Currently on Flomax.      2. Elevated PSA. S/p TRUS bx 4/17/2012 for PSA 6.48ng/mL, pathology negative. Prostate biopsy on 8/28/2018, pathology negative. Most recent PSA: 10.62 ng/ml on 8/28/2018.         PLAN:    Catheter changed today   Urine culture today- will inform patient   Avodart- Rx given today for 3 months   Plan for PVP as scheduled (9/20/2018) then follow up 3 weeks after with Dr. Jerry Pallas      Discussion:   The risks for PVP include: bleed (including a delayed bleed), infection, incontinence ( stress incontinence from rare injury to the sphincter mechanism, and/or urge incontinence due to an overactive bladder combined with decreasing the outlet resistance), retrograde ejaculation, rare impotence, postop scarring/bladder neck contraction, regrowth/residual tissue causing persistent obstruction, persistent irritative urinary symptoms.            Chief Complaint   Patient presents with    Pre-op Exam         HISTORY OF PRESENT ILLNESS:  Shayan Miles is a 70 y.o. male who presents today pre-operatively for PVP procedure. Cysto 8/28/2018: severe trilobar hypertrophy, catheter elated edema along back wall, prostate volume 107 grams. Patient presents today doing well and has had no recent health changes in the interim. Denies dysuria or gross hematuria.  Patient is asymptomatic for urinary infection.      PSA Trend      PSA /TESTOSTERONE - [facility] PSA   Latest Ref Rng     8/28/2018 10.62    11/3/2017 9.980   10/18/2016 7.830   10/13/2015 7.810   10/7/2014 7.240            No flowsheet data found.          Past Medical History:   Diagnosis Date    Dupuytren's contracture of left hand      Dyslipidemia      Eczema      Elevated PSA      Hemorrhoids      Urinary retention                 Past Surgical History:   Procedure Laterality Date    APPENDECTOMY   1972    HX KNEE ARTHROSCOPY   1997 and 2009    HX SHOULDER ARTHROSCOPY   1994 and 2002                 Social History    Substance Use Topics     Smoking status: Never Smoker     Smokeless tobacco: Never Used     Alcohol use Yes         Comment: occ          No Known Allergies           Family History   Problem Relation Age of Onset    Cancer Mother      Asthma Brother                  Current Outpatient Prescriptions   Medication Sig Dispense Refill    tamsulosin (FLOMAX) 0.4 mg capsule Take 2 Caps by mouth daily (after dinner). 180 Cap 3    therapeutic multivitamin (THERAGRAN) tablet Take 1 Tab by mouth daily.        fish oil-omega-3 fatty acids 340-1,000 mg capsule Take 1 Cap by mouth daily.                Review of Systems  Constitutional: Fever: No  Skin: Rash: No  HEENT: Hearing difficulty: No  Eyes: Blurred vision: No  Cardiovascular: Chest pain: No  Respiratory: Shortness of breath: No  Gastrointestinal: Nausea/vomiting: No  Musculoskeletal: Back pain: No  Neurological: Weakness: No  Psychological: Memory loss: No  Comments/additional findings:              Visit Vitals    /60    Ht 5' 4\" (1.626 m)    Wt 140 lb (63.5 kg)    BMI 24.03 kg/m2      Constitutional: WDWN, Pleasant and appropriate affect, No acute distress.     CV:  No peripheral swelling noted, RRR  Respiratory: No respiratory distress or difficulties, CTAB   Male:    GONZALO: Perineum normal to visual inspection, no erythema or irritation, Sphincter with good tone, Rectum with no hemorrhoids, fissures or masses. Prostate is 3.5+ and benign. Skin: No jaundice. Neuro/Psych:  Alert and oriented x 3. Affect appropriate.            REVIEW OF LABS AND IMAGING:       No results found for any visits on 09/18/18.  Naun Cruz MD  [facility], [address] [place] [address] [place].    [facility]  [phone] (office)

## 2018-09-20 NOTE — IP AVS SNAPSHOT
[address] Patient: Prabhjot Jorge MRN: [id number] SURYA:[birth year] A check radha indicates which time of day the medication should be taken. My Medications START taking these medications Instructions Each Dose to Equal  
 Morning Noon Evening Bedtime  
 cephALEXin 500 mg capsule Commonly known as:  Loretta Bayside Your last dose was: Your next dose is: Take 1 Cap by mouth two (2) times a day for 7 days. 500 mg  
    
   
   
   
  
 docusate sodium 100 mg capsule Commonly known as:  Mariposa Grey Your last dose was: Your next dose is: Take 1 Cap by mouth two (2) times a day for 30 days. 100 mg  
    
   
   
   
  
 oxybutynin 5 mg tablet Commonly known as:  SDOMOATS Your last dose was: Your next dose is: Take 1 Tab by mouth three (3) times daily. Indications: URINARY URGENCY  
 5 mg * oxyCODONE-acetaminophen 5-325 mg per tablet Commonly known as:  PERCOCET Your last dose was: Your next dose is: Take 1 Tab by mouth every four (4) hours as needed for Pain. Max Daily Amount: 6 Tabs. 1 Tab * oxyCODONE-acetaminophen 5-325 mg per tablet Commonly known as:  PERCOCET Your last dose was: Your next dose is: Take 1 Tab by mouth every four (4) hours as needed. Max Daily Amount: 6 Tabs. 1 Tab  
    
   
   
   
  
 trimethoprim-sulfamethoxazole 160-800 mg per tablet Commonly known as:  BACTRIM DS, SEPTRA DS Your last dose was: Your next dose is: Take 1 Tab by mouth two (2) times a day. 1 Tab * Notice: This list has 2 medication(s) that are the same as other medications prescribed for you.  Read the directions carefully, and ask your doctor or other care provider to review them with you. CONTINUE taking these medications Instructions Each Dose to Equal  
 Morning Noon Evening Bedtime  
 dutasteride 0.5 mg capsule Commonly known as:  AVODART Your last dose was: Your next dose is: Take 1 Cap by mouth daily (after dinner) for 90 days. 0.5 mg  
    
   
   
   
  
 tamsulosin 0.4 mg capsule Commonly known as:  FLOMAX Your last dose was: Your next dose is: Take 2 Caps by mouth daily (after dinner). 0.8 mg  
    
   
   
   
  
 therapeutic multivitamin tablet Commonly known as:  [facility] Your last dose was: Your next dose is: Take 1 Tab by mouth daily. 1 Tab STOP taking these medications   
 fish oil-omega-3 fatty acids 340-1,000 mg capsule Where to Get Your Medications Information on where to get these meds will be given to you by the nurse or doctor. ! Ask your nurse or doctor about these medications  
  cephALEXin 500 mg capsule  
 docusate sodium 100 mg capsule  
 oxybutynin 5 mg tablet  
 oxyCODONE-acetaminophen 5-325 mg per tablet  
 oxyCODONE-acetaminophen 5-325 mg per tablet  
 trimethoprim-sulfamethoxazole 160-800 mg per tablet

## 2018-09-21 VITALS
OXYGEN SATURATION: 95 % | HEART RATE: 86 BPM | DIASTOLIC BLOOD PRESSURE: 55 MMHG | RESPIRATION RATE: 18 BRPM | HEIGHT: 64 IN | BODY MASS INDEX: 23.73 KG/M2 | WEIGHT: 139 LBS | TEMPERATURE: 98 F | SYSTOLIC BLOOD PRESSURE: 97 MMHG

## 2018-09-21 PROCEDURE — 99218 HC RM OBSERVATION: CPT

## 2018-09-21 RX ORDER — SULFAMETHOXAZOLE AND TRIMETHOPRIM 800; 160 MG/1; MG/1
1 TABLET ORAL 2 TIMES DAILY
Qty: 6 TAB | Refills: 0 | Status: SHIPPED | OUTPATIENT
Start: 2018-09-21 | End: 2019-04-05

## 2018-09-21 RX ORDER — OXYCODONE AND ACETAMINOPHEN 5; 325 MG/1; MG/1
1 TABLET ORAL
Qty: 20 TAB | Refills: 0 | Status: SHIPPED | OUTPATIENT
Start: 2018-09-21 | End: 2019-04-05

## 2018-09-21 RX ADMIN — Medication 10 ML: at 06:00

## 2018-09-21 NOTE — PROGRESS NOTES
Problem: Falls - Risk of  Goal: *Absence of Falls  Document Donna Fall Risk and appropriate interventions in the flowsheet.   Outcome: Progressing Towards Goal  Fall Risk Interventions:  Mobility Interventions: Patient to call before getting OOB    Mentation Interventions: Door open when patient unattended    Medication Interventions: Patient to call before getting OOB    Elimination Interventions: Patient to call for help with toileting needs    History of Falls Interventions: Door open when patient unattended

## 2018-09-21 NOTE — PROGRESS NOTES
0800  Received pt on bed, ansari red but no clots, flushed with out problem with 60 cc sterile NS, no resistance. No pain, offered him pain med  But refused, he said he will take pain  Med at home. 0930  Up  Walking in the room, no pain aware for discharge today. 1045  IV site remove and discharge instructions given by tyler, urine  Red but no clots, offered pain med but refused, awaiting his ride to go home.

## 2018-09-21 NOTE — PROGRESS NOTES
conducted an initial consultation and Spiritual Assessment for Sariah Atkinson, who is a 70 y.o.,male. Patients Primary Language is: [place]. According to the patients EMR Baptist Affiliation is: Non Alevism.     The reason the Patient came to the hospital is:   Patient Active Problem List    Diagnosis Date Noted    BPH with obstruction/lower urinary tract symptoms 09/20/2018   Ashley Cisselin hematuria 09/20/2018    BPH with urinary obstruction 09/20/2018    Urinary retention 07/13/2018    Elevated PSA 03/12/2012        The  provided the following Interventions:  Initiated a relationship of care and support with patient in room 2212 on day one post surgery. Listened empathically as he talked briefly about being here and how he is feeling at present. Patient relates he is dealing with issues but will be going home this afternoon. Provided information about Spiritual Care Services. Offered prayer and assurance of continued prayers on patients behalf. The following outcomes were achieved:  Patient shared limited information about his medical narrative. Patient processed feeling about current hospitalization. Patient expressed gratitude for pastoral care visit. Assessment:  Patient does not have any Taoism/cultural needs that will affect patients preferences in health care. There are no further spiritual or Taoism issues which require Spiritual Care Services interventions at this time. Plan:  Chaplains will continue to follow and will provide pastoral care on an as needed/requested basis    . Charis Zapata   Spiritual Care   [phone]

## 2018-09-21 NOTE — DISCHARGE SUMMARY
Discharge Summary   Admit Date: 9/20/2018  Discharge Date: 9/21/2018      Patient ID:  Giselle Crawley  70 y.o.  1950    No chief complaint on file. Patient Active Problem List    Diagnosis Date Noted    BPH with obstruction/lower urinary tract symptoms 09/20/2018   Rey Pulido hematuria 09/20/2018    BPH with urinary obstruction 09/20/2018    Urinary retention 07/13/2018    Elevated PSA 03/12/2012       Discharge Diagnosis:  BPH  URINARY RETENTION    Current Discharge Medication List      START taking these medications    Details   !! oxyCODONE-acetaminophen (PERCOCET) 5-325 mg per tablet Take 1 Tab by mouth every four (4) hours as needed. Max Daily Amount: 6 Tabs. Qty: 20 Tab, Refills: 0      trimethoprim-sulfamethoxazole (BACTRIM DS, SEPTRA DS) 160-800 mg per tablet Take 1 Tab by mouth two (2) times a day. Qty: 6 Tab, Refills: 0      !! oxyCODONE-acetaminophen (PERCOCET) 5-325 mg per tablet Take 1 Tab by mouth every four (4) hours as needed for Pain. Max Daily Amount: 6 Tabs. Qty: 15 Tab, Refills: 0      docusate sodium (COLACE) 100 mg capsule Take 1 Cap by mouth two (2) times a day for 30 days. Qty: 60 Cap, Refills: 0      oxybutynin (DITROPAN) 5 mg tablet Take 1 Tab by mouth three (3) times daily. Indications: URINARY URGENCY  Qty: 15 Tab, Refills: 3      cephALEXin (KEFLEX) 500 mg capsule Take 1 Cap by mouth two (2) times a day for 7 days. Qty: 14 Cap, Refills: 0       !! - Potential duplicate medications found. Please discuss with provider. CONTINUE these medications which have NOT CHANGED    Details   therapeutic multivitamin (THERAGRAN) tablet Take 1 Tab by mouth daily. dutasteride (AVODART) 0.5 mg capsule Take 1 Cap by mouth daily (after dinner) for 90 days. Qty: 90 Cap, Refills: 0      tamsulosin (FLOMAX) 0.4 mg capsule Take 2 Caps by mouth daily (after dinner).   Qty: 180 Cap, Refills: 3         STOP taking these medications       fish oil-omega-3 fatty acids 340-1,000 mg capsule Comments:   Reason for Stopping:               Operative Procedures:  Cystoscopy and Photovaporization of Prostate with XPS using MoXy    Consultants:  none    Hospital Course:  Uneventful. Stayed for pain control. Pain controlled. Guillaume draining well. Tolerating diet and ambulating. Safe for discharge. Physical Exam on Discharge:  Visit Vitals    BP 97/55 (BP 1 Location: Right arm, BP Patient Position: At rest)    Pulse 86    Temp 98 °F (36.7 °C)    Resp 18    Ht 5' 4\" (1.626 m)    Wt 139 lb (63 kg)    SpO2 95%    BMI 23.86 kg/m2     Visit Vitals    BP 97/55 (BP 1 Location: Right arm, BP Patient Position: At rest)    Pulse 86    Temp 98 °F (36.7 °C)    Resp 18    Ht 5' 4\" (1.626 m)    Wt 139 lb (63 kg)    SpO2 95%    BMI 23.86 kg/m2     Constitutional: WDWN, Pleasant and appropriate affect, No acute distress. CV:  No peripheral swelling noted  Respiratory: No respiratory distress or difficulties  Abdomen:  No abdominal masses or tenderness. No CVA tenderness. No hernias noted.  Male:      SCROTUM:  No scrotal rash or lesions noticed. Normal bilateral testes and epididymis. PENIS: Circumcised. Urethral meatus normal in location and size. No urethral discharge. Guillaume in place - light pink. Draining well. Skin: No jaundice. Neuro/Psych:  Alert and oriented x 3. Affect appropriate. Most Recent BMP and CBC:    No results for input(s): BUN, NA, POTASSIUM, CHLORIDE, CO2 in the last 72 hours. No lab exists for component: GLUCOSE, CALCIUM, CREAT  No results for input(s): WBC, RBC, HCT, MCV, MCH, MCHC, RDW, HCTEXT in the last 72 hours.     No lab exists for component: HEMOGLOBIN, PLATELET      Condition at discharge: Afebrile  Ambulating  Eating, Drinking, Voiding  Stable    Disposition:  Home                         Rx: percocet and septra                         Followup: 3 days for coty Saini MD    Office: [phone]  Pager: [phone]        [unfilled]    PCP:  Maribel Torres MD

## 2018-09-21 NOTE — PROGRESS NOTES
2114: Assumed patient care. Received report from Tim Castro, [facility] (offgoing nurse). Report included SBAR, Kardex, and MAR. Patient resting in bed, denies pain at this time. In no signs of pain or distress. Call light and possessions within reach. 5275: Patient's blood pressure 90/58. Retake was 88/57. Previous blood pressure 99/64 and 106/64. Dr. Socorro Quiroga paged   Dr. Kaiser Carrizales: Notified Dr. Rupa Virk, doctor stated \"that's fine\"    1253: Patient's blood pressure 87/61. Pulse strong and equal, skin warm to touch, heart rate 75, urine output greater than 700 with bloody urine, patient denies difficulty breathing or dizziness, denies pain. Recheck manual: 96/58    4275: Patient ambulated in hallway, tolerated well.

## (undated) DEVICE — SOLUTION IRRIG 3000ML 0.9% SOD CHL FLX CONT 0797208] ICU MEDICAL INC]

## (undated) DEVICE — CATHETER URETH 16FR BLLN 5CC SIL ALLY W/ SIL HYDRGEL 2 W F

## (undated) DEVICE — SYRINGE MED 20ML STD CLR PLAS LUERLOCK TIP N CTRL DISP

## (undated) DEVICE — Device

## (undated) DEVICE — BAG DRNGE NONSTERILE W/ SUCT HOSE CYSTO/UROLOGICAL FOR GE

## (undated) DEVICE — TRAY PREP DRY W/ PREM GLV 2 APPL 6 SPNG 2 UNDPD 1 OVERWRAP

## (undated) DEVICE — BAG DRAIN URIN 2000ML LF STRL -- CONVERT TO ITEM 363123

## (undated) DEVICE — MASTISOL ADHESIVE LIQ 2/3ML

## (undated) DEVICE — KENDALL SCD EXPRESS SLEEVES, KNEE LENGTH, MEDIUM: Brand: KENDALL SCD

## (undated) DEVICE — CONTAINER DRN 20L DISP FLUIDRN LLS

## (undated) DEVICE — SOL IRRIGATION INJ NACL 0.9% 500ML BTL

## (undated) DEVICE — SPONGE GZ W4XL4IN COT 12 PLY TYP VII WVN C FLD DSGN

## (undated) DEVICE — STERILE LATEX POWDER-FREE SURGICAL GLOVESWITH NITRILE COATING: Brand: PROTEXIS

## (undated) DEVICE — SYR IRR CATH TIP LR ADPT 70ML -- CONVERT TO ITEM 363120